# Patient Record
(demographics unavailable — no encounter records)

---

## 2025-03-07 NOTE — PLAN
[FreeTextEntry1] : 10/4/23 Plan: Continue with hygiene recommendations from PCP/dermatologist For both axillary wounds, adaptic, aquacel, ABD. May tuck ABD into bra, or may tape in place Supplies ordered Counseled patient and mother that hidradenitis may be a life-long issue, and that she may require biologics or surgery in the future. Referral placed for pediatric dermatology Referral placed for pediatric psychology MD access form given with number to call to make consult appts.  Follow up in 4 weeks  11-3-23: Plan: cont recs as per derm.   Call derm to make appt for eval of left chest wall/axilla rash For both axillary wounds, adaptic, aquacel, ABD. May tuck ABD into bra, or may tape in place Supplies ordered call for Psychology appt f/u 3-4 weeks   12-1-23: cont recs as per derm.  Pt has derm f/u scheduled For both axillary wounds, adaptic, aquacel, ABD. May tuck ABD into bra, or may tape in place Supplies ordered f/u derm f/u 4-6 weeks   1-24-26: Plan: cont recs as per derm For both axillary wounds, adaptic, aquacel, ABD. May tuck ABD into bra, or may tape in place for rt nipple  adaptic/ gauze use bra to hold inside. Supplies ordered f/u derm f/u 4-6 weeks   3-1-24: Plan: cont recs as per derm left axilla healed Rt nipple healed Rt axilla: wash with soap and water.  ABD pad held in place with bra. f/u derm f/u 6 weeks   8-2-24 Plan: Recommend f/u with derm for updated recommendations Supplies ordered: ABD pads and aquacel. b/l axilla: wash with soap and water.  aquacel, ABD. May tuck ABD into bra, or may tape in place f/u 3-4 weeks   8-30-24: Plan: wash with soap and water Left axilla:  foam dressing daily  Rt axilla:  aquacel rope packing/ ABD.  hold in place with bra (tape if needed) follow derm recs f/u derm supplies ordered  f/u 3-4 weeks  9-27-24: Plan: Left axilla HEALED Rt axilla:  aquacel/abd/ hold in place with bra, tape if needed supplies ordered f/u derm f/u 4 weeks  3/7/25 Plan - supplies ordered f/u with derm. cotton against skin, nothing too tight shower wound, tiny piece of aquacel/will order tiny 2x2 foam so surrounding skin can breathe follow up 4-6 weeks

## 2025-03-07 NOTE — ASSESSMENT
[FreeTextEntry1] : Hidradenitis suppurativa (705.83) (L73.2) Open wound of left axillary region, initial encounter (880.02) (S41.102A) Open wound of left chest wall, initial encounter (875.0) (S21.102A) Open wound of right axillary region, initial encounter (880.02) (S41.101A) 10/4/23 Patient is a 15yo F who presents today with her mother for evaluation of left axillary and chest wall wounds consistent with hidradenitis suppurtiva. Has been seen by [facility] surgery and dermatology, though last saw dermatologist in August. No fevers. Has had some recent weight loss. On exam: Left axillary and left chest wall changes consistent with hidradenitis suppurtiva. Very sensitive to touch. There is some purulent drainage from the left axilla. Left chest wall with open superficial areas with mild slough and drainage. Right axilla is nontender, Hypergranulation tissue present with purulent drainage from area of hypergranulation. Crusting on bilateral nipples consistent with dried drainage, non-bloody.  11-3-23: Pt here for f/u accompanied by mother No new complaints Saw derm.  On spironolactone now.  starting Humira discussed as an option and to be addressed at next derm visit as per derm note. On exam: left chest wall into axilla and upper arm with small papular  like rash.   left axilla:  hyper granulation tissue with wound laterally into SQ.  s/p mechanical debridement  s/p silver nitrate to hypergran  No s/s of infection.  left nipple area: with serous crusting  No s/s of infection.  rt axilla:  small open sinus with drainage.  s/p mechanical debridement No s/s of infection.  rt nipple with serous crusting. No s/s of infection.   12-1-23: Pt here for f/u Accompanied by mother No new complaints On exam:  left axilla with 2 wounds:  one superficial and one with hypergran. No s/s of infection. s/p mechanical debridement s/p silver nitrate to hypergran Left chest wound healed.  Rt axilla with one with with hypergran.  s/p mechanical debridement .s/p silver nitrate to hypergran  b/l nipples with no wounds or crusting  1-26-24: Pt here for f/u Accompanied by father Recently saw derm for a flare.  Given doxy and started on spironolactone On exam: left axilla with 1 wound with hypergran tissue.  No s/s of infection. s/p mechanical debridement s/p silver nitrate to hypergran    Rt axilla with one wound with with hypergran.  No s/s of infection.   s/p mechanical debridement .s/p silver nitrate to hypergran  left nipple healed Rt nipple with superficial wounds and crusting.  No s/s of infection.   3-1-24: Pt here for f/u Accompanied by father No new complaints On exam:  Left axilla: healed. No s/s of infection.  Rt axilla:  small opening with minimal drainage Rt nipple healed  8-2-24: Pt here for f/u Accompanied by mother No new complaints On exam:  Re-opened sinus tract of the L axilla with hypergranulation tissue present Continued drainage of the right axilla with hypergran  both wounds s/p silver nitrate to hypergan s/p mechanical debridement  No s/s of infection.   8-30-24: Pt here for f/u Accompanied by father saw derm yesterday-- recommended oral doxy and bactroban x 2 weeks  No new complaints On exam: left axilla:  necrotic skin tag vs hypergran s/p excisional debridement revealing superficial wound Silver nitrate used as hemostasis. No s/s of infection.  Rt axilla:  purulent drainage followed by sanguinous drainage.  s/p mechanical debridement No periwound erythema, warmth, tenderness, induration, fluctuance or crepitus.   9-27-24: Pt here for f/u Accompanied by father No new complaints Saw derm this week and will cont doxy On exam: left axilla: healed.  No s/s of infection.  Rt axilla:  wound with depth and hypergran.  No s/s of infection. .s/p silver nitrate to hypergran 3/7/25 accompanied by Dad feels good Left axilla remains healed right axilla - 1 small opening, tiny bit of serous drainage, had been using aquacel, tape, the yadira wound because the tape is so large is causing irritation to surrounding skin

## 2025-03-07 NOTE — PHYSICAL EXAM
[Skin Ulcer] : ulcer [Alert] : alert [Oriented to Person] : oriented to person [Oriented to Place] : oriented to place [Oriented to Time] : oriented to time [Calm] : calm [de-identified] : healthy appearing young woman, thin [de-identified] : atraumatic [de-identified] : supple [de-identified] : symmetric chest rise [de-identified] : FROM [de-identified] : axillary wounds  [de-identified] : quiet [de-identified] : Wound and skin changes consistent with hidradenitis suppurtiva extending from left axilla across the chest wall with scaling of the skin. No periwound erythema warmth fluctuance crepitus or odor.  [de-identified] : Right axilla with small ~1cm area of hypergranular tissue with purulent drainage. Non-tender to palpation. No periwound erythema warmth tenderness  fluctuance or crepitus

## 2025-03-07 NOTE — REASON FOR VISIT
[Follow-Up: _____] : a [unfilled] follow-up visit [Parent] : parent [FreeTextEntry1] : hidradenitis suppurativa, axillary

## 2025-03-07 NOTE — HISTORY OF PRESENT ILLNESS
[FreeTextEntry1] : Ms. ANGELES BASURTO is a 14 year female who presents to the office with a wound for at least 7 months duration, she is accompanied by her mother. The wound is located on the left axilla and left breast, as well as right axilla. Wound initially presented on the left axilla with a small draining lesion. Pediatrician was concerned for hidradenitis suppurativa and referred her to pediatric surgery as well as dermatology. She saw peds surg in May 2023, who had recommended topical clindamycin and to see the pediatric dermatologist. Recommended dermatologist was not covered by patient's insurance, but she was able to see a general dermatologist who prescribed oral doxycyline and hibaclens. She did not notice any improvement with the doxycycline and developed worsening of the wounds including open wound on the anterior breast, as well as nipple discharge. She was ultimately hospitalized for further workup. She was evaluated by a breast surgeon who believed drainage was related to the hidradenitis. She was ultimately referred to wound care clinic for further recommendations. She had been on antibiotics, completed course of doxycycline last week. She is having pain in the left axilla, and is very sensitive to touch. Has been using hibaclens for right axilla. Is not using deodorant at this time. Has been dressing the left axilla and chest wound with ABD. Mother notes a recent 20lb weight loss. No fevers, no systemic symptoms of infection. No family history of hidradenitis or other autoimmune disease.  8-2-24 Pt presents for new wound of the left axilla and continued drainage of the R underarm area.  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